# Patient Record
Sex: FEMALE | Race: WHITE | NOT HISPANIC OR LATINO | Employment: FULL TIME | URBAN - NONMETROPOLITAN AREA
[De-identification: names, ages, dates, MRNs, and addresses within clinical notes are randomized per-mention and may not be internally consistent; named-entity substitution may affect disease eponyms.]

---

## 2018-05-25 ENCOUNTER — HISTORICAL (OUTPATIENT)
Dept: ADMINISTRATIVE | Facility: HOSPITAL | Age: 23
End: 2018-05-25

## 2018-06-09 ENCOUNTER — HISTORICAL (OUTPATIENT)
Dept: ADMINISTRATIVE | Facility: HOSPITAL | Age: 23
End: 2018-06-09

## 2018-08-01 ENCOUNTER — HISTORICAL (OUTPATIENT)
Dept: ADMINISTRATIVE | Facility: HOSPITAL | Age: 23
End: 2018-08-01

## 2019-07-25 ENCOUNTER — HISTORICAL (OUTPATIENT)
Dept: ADMINISTRATIVE | Facility: HOSPITAL | Age: 24
End: 2019-07-25

## 2022-04-10 ENCOUNTER — HISTORICAL (OUTPATIENT)
Dept: ADMINISTRATIVE | Facility: HOSPITAL | Age: 27
End: 2022-04-10

## 2022-04-27 VITALS
BODY MASS INDEX: 38.15 KG/M2 | WEIGHT: 229 LBS | HEIGHT: 65 IN | DIASTOLIC BLOOD PRESSURE: 66 MMHG | SYSTOLIC BLOOD PRESSURE: 126 MMHG

## 2025-07-02 ENCOUNTER — HOSPITAL ENCOUNTER (EMERGENCY)
Facility: HOSPITAL | Age: 30
Discharge: HOME | End: 2025-07-02
Attending: EMERGENCY MEDICINE
Payer: COMMERCIAL

## 2025-07-02 VITALS
OXYGEN SATURATION: 98 % | HEART RATE: 80 BPM | WEIGHT: 247.38 LBS | RESPIRATION RATE: 16 BRPM | DIASTOLIC BLOOD PRESSURE: 74 MMHG | TEMPERATURE: 98 F | SYSTOLIC BLOOD PRESSURE: 121 MMHG | BODY MASS INDEX: 37.62 KG/M2

## 2025-07-02 DIAGNOSIS — S81.011A LACERATION OF RIGHT KNEE, INITIAL ENCOUNTER: ICD-10-CM

## 2025-07-02 DIAGNOSIS — M25.561 RIGHT KNEE PAIN: ICD-10-CM

## 2025-07-02 DIAGNOSIS — S80.01XA CONTUSION OF RIGHT KNEE, INITIAL ENCOUNTER: Primary | ICD-10-CM
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
## 2025-07-02 LAB — B-HCG UR QL: NEGATIVE

## 2025-07-02 PROCEDURE — 25000003 PHARM REV CODE 250: Performed by: PHYSICIAN ASSISTANT

## 2025-07-02 PROCEDURE — 99284 EMERGENCY DEPT VISIT MOD MDM: CPT | Mod: 25

## 2025-07-02 PROCEDURE — 81025 URINE PREGNANCY TEST: CPT | Performed by: PHYSICIAN ASSISTANT

## 2025-07-02 RX ORDER — MUPIROCIN 20 MG/G
OINTMENT TOPICAL DAILY
Qty: 22 G | Refills: 0 | Status: SHIPPED | OUTPATIENT
Start: 2025-07-02 | End: 2025-07-12

## 2025-07-02 RX ORDER — CEPHALEXIN 500 MG/1
500 CAPSULE ORAL 4 TIMES DAILY
Qty: 40 CAPSULE | Refills: 0 | Status: SHIPPED | OUTPATIENT
Start: 2025-07-02 | End: 2025-07-12

## 2025-07-02 RX ORDER — CEPHALEXIN 250 MG/1
500 CAPSULE ORAL
Status: COMPLETED | OUTPATIENT
Start: 2025-07-02 | End: 2025-07-02

## 2025-07-02 RX ORDER — AMOXICILLIN AND CLAVULANATE POTASSIUM 875; 125 MG/1; MG/1
1 TABLET, FILM COATED ORAL
Status: DISCONTINUED | OUTPATIENT
Start: 2025-07-02 | End: 2025-07-02

## 2025-07-02 RX ORDER — HYDROCODONE BITARTRATE AND ACETAMINOPHEN 5; 325 MG/1; MG/1
1 TABLET ORAL EVERY 6 HOURS PRN
Qty: 12 TABLET | Refills: 0 | Status: SHIPPED | OUTPATIENT
Start: 2025-07-02

## 2025-07-02 RX ADMIN — CEPHALEXIN 500 MG: 250 CAPSULE ORAL at 09:07

## 2025-07-03 NOTE — DISCHARGE INSTRUCTIONS
LEAVE on the dressing we applied for 24-48hours. Remove. Wash with DIAL soap and warm running water. DO NOT SOAK the area at all. Do not bathe- SHOWER only (basically do not soak the wound). If you notice any OOZING, REDNESS, or RUN FEVER >100.4F RETURN TO CLINIC. CHANGE DRESSING DAILY. DO NOT USE: peroxide, alcohol, or anything other than dial soap and water.    If the area gets dirty, clean it, dry it, wrap it. Leave it opened to air when watching tv.

## 2025-07-03 NOTE — ED PROVIDER NOTES
Encounter Date: 2025       History     Chief Complaint   Patient presents with    Knee Pain     Right knee pain after fall yesterday. Small laceration noted to knee, bleeding controlled.      See [facility] for details.      The history is provided by the patient. No  was used.     Review of patient's allergies indicates:  No Known Allergies  History reviewed. No pertinent past medical history.  Past Surgical History:   Procedure Laterality Date     SECTION      CHOLECYSTECTOMY      HYSTERECTOMY       No family history on file.  Social History[1]  Review of Systems   Constitutional: Negative.    HENT: Negative.     Eyes: Negative.    Respiratory: Negative.     Cardiovascular: Negative.    Gastrointestinal: Negative.    Genitourinary: Negative.    Musculoskeletal: Negative.    Skin:  Positive for wound.   Neurological: Negative.    All other systems reviewed and are negative.      Physical Exam     Initial Vitals [25 1930]   BP Pulse Resp Temp SpO2   119/69 85 16 98.3 °F (36.8 °C) 97 %      MAP       --         Physical Exam    Nursing note and vitals reviewed.  Constitutional: She appears well-developed and well-nourished. She is not diaphoretic. No distress.   HENT:   Head: Atraumatic.   Eyes: Lids are normal.   Cardiovascular:  Normal rate.           Pulmonary/Chest: Effort normal. No respiratory distress.   Abdominal: Abdomen is flat.     Neurological: She is alert and oriented to person, place, and time. She has normal strength. She is not disoriented. GCS eye subscore is 4. GCS verbal subscore is 5. GCS motor subscore is 6.   Skin: Skin is warm. Laceration noted.        Laceration right anterior knee.  Mild swelling.  Tender to palpation.  No ligament laxity.  Patient able to ambulate with antalgic gait.  Laceration appears to be closing by secondary intention already on initial exam.  It has been over 24 hours since initial injury.  No active bleeding.   Psychiatric: She has a  normal mood and affect. Her speech is normal and behavior is normal. Judgment and thought content normal. Cognition and memory are normal.         ED Course   Procedures  Labs Reviewed   PREGNANCY TEST, URINE RAPID - Normal       Result Value    hCG Qualitative, Urine Negative            Imaging Results              X-Ray Knee 3 View Right (Preliminary result)  Result time 07/02/25 21:26:07      Wet Read by Melva Malave PA (07/02/25 21:26:07, [facility] - Emergency Dept, Emergency Medicine)    No acute osseous finding.  No obvious foreign body on initial exam.                                     Medications   cephALEXin capsule 500 mg (has no administration in time range)     Medical Decision Making  29yoWF with no known past medical history presents to the emergency department after trip and fall onto step over 24 hours ago.  Patient had a laceration at the time.  She cleaned the area.  She is up-to-date on her tetanus.  No other injury.  Now having right knee pain.  Not actively bleeding.    Problems Addressed:  Laceration of right knee, initial encounter: acute illness or injury  Right knee pain: acute illness or injury     Details: Differential diagnosis included but not limited to:  Laceration, knee contusion, patellar fracture, ligament disruption, tendon disruption, other etiology     Patient does not have any obvious fracture or osseous finding on imaging.  No obvious foreign body.  Wound already appears to be healing on initial exam.  It has been over 24 hours in the wound was initially contaminated.  She is up-to-date on her tetanus.  Area was cleaned and wrapped prior to discharge.  She was given antibiotics.  She will follow up with the primary care as needed.  Weightbear as tolerated. All questions asked and answered at the time of the visit. Strict ER precautions given. Patient and family members agreeable to plan.       Amount and/or Complexity of Data Reviewed  Radiology: ordered.  Decision-making details documented in ED Course.    Risk  Prescription drug management.                                      Clinical Impression:  Final diagnoses:  [M25.561] Right knee pain  [S80.01XA] Contusion of right knee, initial encounter (Primary)  [W19.XXXA] Fall, initial encounter  [S81.011A] Laceration of right knee, initial encounter          ED Disposition Condition    Discharge Stable          ED Prescriptions       Medication Sig Dispense Start Date End Date Auth. Provider    cephALEXin (KEFLEX) 500 MG capsule Take 1 capsule (500 mg total) by mouth 4 (four) times daily. for 10 days 40 capsule 7/2/2025 7/12/2025 Melva Malave PA    mupirocin (BACTROBAN) 2 % ointment Apply topically once daily. for 10 days 22 g 7/2/2025 7/12/2025 Melva Malave PA    HYDROcodone-acetaminophen (NORCO) 5-325 mg per tablet Take 1 tablet by mouth every 6 (six) hours as needed for Pain. 12 tablet 7/2/2025 -- Melva Malave PA          Follow-up Information       Follow up With Specialties Details Why Contact Info    Syed An MD Family Medicine Schedule an appointment as soon as possible for a visit   [address] [place] [address] [phone]      [facility] - Emergency Dept Emergency Medicine  As needed, If symptoms worsen [address] [place] [facility] [id number] [phone]            This note was typed partially using voice recognition software.  Please be reminded that not all corrections/addendums to grammar may have been made prior to closing of this chart.         [1]   Social History  Tobacco Use    Smoking status: Every Day     Current packs/day: 0.25     Types: Cigarettes    Smokeless tobacco: Never   Substance Use Topics    Alcohol use: Yes     Comment: special occosias    Drug use: Not Currently     Types: Marijuana        Melva Malave PA  07/02/25 2324

## 2025-08-13 PROBLEM — N89.8 VAGINAL ODOR: Status: ACTIVE | Noted: 2025-08-13

## 2025-08-13 PROBLEM — E28.2 PCO (POLYCYSTIC OVARIES): Status: ACTIVE | Noted: 2025-08-13

## 2025-08-13 PROBLEM — L68.0 HIRSUTISM: Status: ACTIVE | Noted: 2025-08-13

## 2025-08-13 PROBLEM — R10.2 PELVIC PAIN: Status: ACTIVE | Noted: 2025-08-13

## 2025-08-13 PROBLEM — N94.6 DYSMENORRHEA: Status: ACTIVE | Noted: 2025-08-13

## 2025-08-13 PROBLEM — N92.0 MENORRHAGIA WITH REGULAR CYCLE: Status: ACTIVE | Noted: 2025-08-13

## 2025-08-13 PROCEDURE — 87798 DETECT AGENT NOS DNA AMP: CPT

## 2025-08-13 PROCEDURE — 87661 TRICHOMONAS VAGINALIS AMPLIF: CPT

## 2025-08-13 PROCEDURE — 87801 DETECT AGNT MULT DNA AMPLI: CPT | Performed by: STUDENT IN AN ORGANIZED HEALTH CARE EDUCATION/TRAINING PROGRAM
